# Patient Record
Sex: MALE | Race: WHITE
[De-identification: names, ages, dates, MRNs, and addresses within clinical notes are randomized per-mention and may not be internally consistent; named-entity substitution may affect disease eponyms.]

---

## 2022-10-31 ENCOUNTER — HOSPITAL ENCOUNTER (INPATIENT)
Dept: HOSPITAL 54 - ER | Age: 77
LOS: 2 days | Discharge: ANOTHER HOSPITAL | DRG: 193 | End: 2022-11-02
Attending: NURSE PRACTITIONER | Admitting: NURSE PRACTITIONER
Payer: COMMERCIAL

## 2022-10-31 VITALS — WEIGHT: 294 LBS | BODY MASS INDEX: 38.97 KG/M2 | HEIGHT: 73 IN

## 2022-10-31 DIAGNOSIS — J44.1: ICD-10-CM

## 2022-10-31 DIAGNOSIS — J96.01: ICD-10-CM

## 2022-10-31 DIAGNOSIS — E66.01: ICD-10-CM

## 2022-10-31 DIAGNOSIS — Z87.891: ICD-10-CM

## 2022-10-31 DIAGNOSIS — Z79.899: ICD-10-CM

## 2022-10-31 DIAGNOSIS — E78.5: ICD-10-CM

## 2022-10-31 DIAGNOSIS — J15.9: Primary | ICD-10-CM

## 2022-10-31 DIAGNOSIS — E03.9: ICD-10-CM

## 2022-10-31 DIAGNOSIS — Z20.822: ICD-10-CM

## 2022-10-31 DIAGNOSIS — E87.1: ICD-10-CM

## 2022-10-31 DIAGNOSIS — E66.9: ICD-10-CM

## 2022-10-31 DIAGNOSIS — G89.29: ICD-10-CM

## 2022-10-31 DIAGNOSIS — I10: ICD-10-CM
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
## 2022-10-31 DIAGNOSIS — Z79.51: ICD-10-CM

## 2022-10-31 DIAGNOSIS — G47.33: ICD-10-CM

## 2022-10-31 DIAGNOSIS — Z91.048: ICD-10-CM

## 2022-10-31 DIAGNOSIS — D50.9: ICD-10-CM

## 2022-10-31 DIAGNOSIS — E88.09: ICD-10-CM

## 2022-10-31 DIAGNOSIS — Z85.46: ICD-10-CM

## 2022-10-31 DIAGNOSIS — Z79.01: ICD-10-CM

## 2022-10-31 DIAGNOSIS — Z91.018: ICD-10-CM

## 2022-10-31 DIAGNOSIS — N40.0: ICD-10-CM

## 2022-10-31 DIAGNOSIS — I48.91: ICD-10-CM

## 2022-10-31 LAB
ALBUMIN SERPL BCP-MCNC: 2.4 G/DL (ref 3.4–5)
ALP SERPL-CCNC: 102 U/L (ref 46–116)
ALT SERPL W P-5'-P-CCNC: 11 U/L (ref 12–78)
AST SERPL W P-5'-P-CCNC: 20 U/L (ref 15–37)
BASE EXCESS BLDA CALC-SCNC: -2.6 MMOL/L
BASE EXCESS BLDA CALC-SCNC: -3.2 MMOL/L
BASOPHILS # BLD AUTO: 0 K/UL (ref 0–0.2)
BASOPHILS NFR BLD AUTO: 0.2 % (ref 0–2)
BILIRUB DIRECT SERPL-MCNC: 0.5 MG/DL (ref 0–0.2)
BILIRUB SERPL-MCNC: 0.7 MG/DL (ref 0.2–1)
BUN SERPL-MCNC: 11 MG/DL (ref 7–18)
CALCIUM SERPL-MCNC: 8.4 MG/DL (ref 8.5–10.1)
CHLORIDE SERPL-SCNC: 96 MMOL/L (ref 98–107)
CO2 SERPL-SCNC: 27 MMOL/L (ref 21–32)
CREAT SERPL-MCNC: 0.8 MG/DL (ref 0.6–1.3)
EOSINOPHIL NFR BLD AUTO: 5.1 % (ref 0–6)
GLUCOSE SERPL-MCNC: 104 MG/DL (ref 74–106)
HCT VFR BLD AUTO: 41 % (ref 39–51)
HGB BLD-MCNC: 13.1 G/DL (ref 13.5–17.5)
INHALED O2 CONCENTRATION: 28 %
INHALED O2 CONCENTRATION: 50 %
LYMPHOCYTES NFR BLD AUTO: 1.3 K/UL (ref 0.8–4.8)
LYMPHOCYTES NFR BLD AUTO: 12.2 % (ref 20–44)
MCHC RBC AUTO-ENTMCNC: 32 G/DL (ref 31–36)
MCV RBC AUTO: 96 FL (ref 80–96)
MONOCYTES NFR BLD AUTO: 1.3 K/UL (ref 0.1–1.3)
MONOCYTES NFR BLD AUTO: 11.9 % (ref 2–12)
NEUTROPHILS # BLD AUTO: 7.6 K/UL (ref 1.8–8.9)
NEUTROPHILS NFR BLD AUTO: 70.6 % (ref 43–81)
PCO2 TEMP ADJ BLDA: 38 MMHG (ref 35–45)
PCO2 TEMP ADJ BLDA: 45.1 MMHG (ref 35–45)
PH TEMP ADJ BLDA: 7.33 [PH] (ref 7.35–7.45)
PH TEMP ADJ BLDA: 7.37 [PH] (ref 7.35–7.45)
PLATELET # BLD AUTO: 282 K/UL (ref 150–450)
PO2 TEMP ADJ BLDA: 41.8 MMHG (ref 75–100)
PO2 TEMP ADJ BLDA: 71.4 MMHG (ref 75–100)
POTASSIUM SERPL-SCNC: 5.1 MMOL/L (ref 3.5–5.1)
PROT SERPL-MCNC: 6.4 G/DL (ref 6.4–8.2)
RBC # BLD AUTO: 4.27 MIL/UL (ref 4.5–6)
SODIUM SERPL-SCNC: 130 MMOL/L (ref 136–145)
WBC NRBC COR # BLD AUTO: 10.8 K/UL (ref 4.3–11)

## 2022-10-31 PROCEDURE — A4217 STERILE WATER/SALINE, 500 ML: HCPCS

## 2022-10-31 PROCEDURE — C9803 HOPD COVID-19 SPEC COLLECT: HCPCS

## 2022-10-31 PROCEDURE — A6253 ABSORPT DRG > 48 SQ IN W/O B: HCPCS

## 2022-10-31 PROCEDURE — G0378 HOSPITAL OBSERVATION PER HR: HCPCS

## 2022-10-31 PROCEDURE — A6403 STERILE GAUZE>16 <= 48 SQ IN: HCPCS

## 2022-10-31 NOTE — NUR
MARJ FROM [facility] CALLED NO BEDS AVAILABLE. AUTH TO STAY [id number] PER 

ALEKSANDRA MARTINEZ.

## 2022-10-31 NOTE — NUR
CALLED [facility] [phone] [facility] [[email or web address]] WILL CALL US BACK.

## 2022-10-31 NOTE — NUR
BIB RA C/O SOB AND DESATURATION AT [facility]F PTA; HX OF COPD. PLACED ON BED, AAOX4, 
DYSPNEIC RR- 25, SATURATING AT 96% WITH NRM. MD AND RESPIRATORY TECH AT 
BEDSIDE.

## 2022-10-31 NOTE — NUR
CALLED [facility] [phone] PER ENRIQUE THEY ARE STILL WORKING ON BED 
ASSIGNMENT. AND WILL CALL US BACK.

## 2022-11-01 VITALS — DIASTOLIC BLOOD PRESSURE: 77 MMHG | SYSTOLIC BLOOD PRESSURE: 121 MMHG

## 2022-11-01 RX ADMIN — FERROUS SULFATE TAB 325 MG (65 MG ELEMENTAL FE) SCH MG: 325 (65 FE) TAB at 17:37

## 2022-11-01 RX ADMIN — CALCIUM SCH MG: 500 TABLET ORAL at 17:37

## 2022-11-01 RX ADMIN — TAMSULOSIN HYDROCHLORIDE SCH MG: 0.4 CAPSULE ORAL at 21:23

## 2022-11-01 RX ADMIN — Medication SCH MG: at 17:37

## 2022-11-01 RX ADMIN — ALBUTEROL SULFATE SCH MG: 2.5 SOLUTION RESPIRATORY (INHALATION) at 14:56

## 2022-11-01 RX ADMIN — PANTOPRAZOLE SODIUM SCH MG: 40 TABLET, DELAYED RELEASE ORAL at 07:34

## 2022-11-01 RX ADMIN — Medication SCH MG: at 08:16

## 2022-11-01 RX ADMIN — Medication SCH MG: at 14:56

## 2022-11-01 RX ADMIN — POTASSIUM CHLORIDE SCH MEQ: 750 TABLET, FILM COATED, EXTENDED RELEASE ORAL at 10:03

## 2022-11-01 RX ADMIN — ALBUTEROL SULFATE SCH MG: 2.5 SOLUTION RESPIRATORY (INHALATION) at 11:30

## 2022-11-01 RX ADMIN — THERA TABS SCH UDTAB: TAB at 10:04

## 2022-11-01 RX ADMIN — ATORVASTATIN CALCIUM SCH MG: 10 TABLET, FILM COATED ORAL at 21:23

## 2022-11-01 RX ADMIN — ALBUTEROL SULFATE SCH MG: 2.5 SOLUTION RESPIRATORY (INHALATION) at 20:04

## 2022-11-01 RX ADMIN — Medication SCH MG: at 10:03

## 2022-11-01 RX ADMIN — GABAPENTIN SCH MG: 100 CAPSULE ORAL at 13:49

## 2022-11-01 RX ADMIN — DABIGATRAN ETEXILATE MESYLATE SCH MG: 150 CAPSULE ORAL at 17:39

## 2022-11-01 RX ADMIN — FUROSEMIDE SCH MG: 10 INJECTION, SOLUTION INTRAMUSCULAR; INTRAVENOUS at 10:02

## 2022-11-01 RX ADMIN — Medication SCH MG: at 13:30

## 2022-11-01 RX ADMIN — GABAPENTIN SCH MG: 100 CAPSULE ORAL at 17:38

## 2022-11-01 RX ADMIN — Medication SCH MG: at 11:30

## 2022-11-01 RX ADMIN — FERROUS SULFATE TAB 325 MG (65 MG ELEMENTAL FE) SCH MG: 325 (65 FE) TAB at 10:03

## 2022-11-01 RX ADMIN — Medication SCH MG: at 23:40

## 2022-11-01 RX ADMIN — LEVOFLOXACIN SCH MG: 250 TABLET, FILM COATED ORAL at 11:58

## 2022-11-01 RX ADMIN — LEVOTHYROXINE SODIUM SCH MCG: 50 TABLET ORAL at 07:34

## 2022-11-01 RX ADMIN — Medication SCH MG: at 20:04

## 2022-11-01 RX ADMIN — POLYETHYLENE GLYCOL 3350 SCH GM: 17 POWDER, FOR SOLUTION ORAL at 09:00

## 2022-11-01 RX ADMIN — DOCUSATE SODIUM SCH MG: 250 CAPSULE, LIQUID FILLED ORAL at 10:03

## 2022-11-01 RX ADMIN — GABAPENTIN SCH MG: 100 CAPSULE ORAL at 10:04

## 2022-11-01 RX ADMIN — Medication SCH GM: at 10:50

## 2022-11-01 RX ADMIN — ALBUTEROL SULFATE SCH MG: 2.5 SOLUTION RESPIRATORY (INHALATION) at 23:40

## 2022-11-01 RX ADMIN — AMLODIPINE BESYLATE SCH MG: 2.5 TABLET ORAL at 17:37

## 2022-11-01 RX ADMIN — ZINC OXIDE SCH GM: 200 OINTMENT TOPICAL at 15:43

## 2022-11-01 RX ADMIN — Medication SCH MG: at 19:30

## 2022-11-01 RX ADMIN — Medication SCH MG: at 20:06

## 2022-11-01 RX ADMIN — ENOXAPARIN SODIUM SCH MG: 40 INJECTION SUBCUTANEOUS at 12:01

## 2022-11-01 RX ADMIN — CALCIUM SCH MG: 500 TABLET ORAL at 10:04

## 2022-11-01 RX ADMIN — Medication SCH MG: at 10:04

## 2022-11-01 RX ADMIN — OXYCODONE HYDROCHLORIDE AND ACETAMINOPHEN SCH MG: 500 TABLET ORAL at 10:04

## 2022-11-01 RX ADMIN — DOCUSATE SODIUM SCH MG: 250 CAPSULE, LIQUID FILLED ORAL at 17:37

## 2022-11-01 RX ADMIN — AMLODIPINE BESYLATE SCH MG: 2.5 TABLET ORAL at 10:05

## 2022-11-01 RX ADMIN — DABIGATRAN ETEXILATE MESYLATE SCH MG: 150 CAPSULE ORAL at 09:00

## 2022-11-01 RX ADMIN — VITAMIN D, TAB 1000IU (100/BT) SCH UNIT: 25 TAB at 10:02
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
## 2022-11-01 NOTE — NUR
Accepting RN Note

Patient Arrived to unit safely. AOx4 able to express his own concerns, patient states he is 
in no pain at the moment. IV s/l with no signs of infiltration. Patient able to provide his 
own health history, states daughter will be coming in later on today. All safety precautions 
taken, patient on O2 1lt with no signs of distress. All safety precautions taken, call light 
and table within reach, bed in fowlers position, at lowest. Will continue to monitor 
throughout shift.

## 2022-11-01 NOTE — NUR
RN OPENING NOTES;



RECEIVED PT IN BED SLEEPING BUT EASY TO AROUSED,AOX3 ABLE TO MAKES NEED KNOWN.ON 1L O2 VIA 
NC ADI WELL SATTING 98%,NO SIGN SOB/DISTRESS NOTED,NO COMPLAIN OF PAIN/DISCOMFORT AT THIS 
TIME,IV ACCESS L CHEST PORT A CATH PATENT AND INTACT,SAFETY MEASURED INPLACE,CALL LIGHT 
WITHIN REACH,WILL CONTINUE TO MONITOR.

## 2022-11-01 NOTE — NUR
RN NOTES;



PATIENT WAS HAVING UNCONTROL AFIB, ,I LISANDRA COBB,LIZANDRO TALBERT,WITH A NEW ORDERED 
CARDIZEM 10MG/2ML,WAS GIVEN,NO  SIGN A/R NOTED.

## 2022-11-02 VITALS — SYSTOLIC BLOOD PRESSURE: 113 MMHG | DIASTOLIC BLOOD PRESSURE: 94 MMHG

## 2022-11-02 VITALS — SYSTOLIC BLOOD PRESSURE: 116 MMHG | DIASTOLIC BLOOD PRESSURE: 83 MMHG

## 2022-11-02 VITALS — SYSTOLIC BLOOD PRESSURE: 123 MMHG | DIASTOLIC BLOOD PRESSURE: 83 MMHG

## 2022-11-02 VITALS — SYSTOLIC BLOOD PRESSURE: 120 MMHG | DIASTOLIC BLOOD PRESSURE: 55 MMHG

## 2022-11-02 VITALS — SYSTOLIC BLOOD PRESSURE: 130 MMHG | DIASTOLIC BLOOD PRESSURE: 65 MMHG

## 2022-11-02 VITALS — SYSTOLIC BLOOD PRESSURE: 121 MMHG | DIASTOLIC BLOOD PRESSURE: 73 MMHG

## 2022-11-02 LAB
BASOPHILS # BLD AUTO: 0.1 K/UL (ref 0–0.2)
BASOPHILS NFR BLD AUTO: 0.6 % (ref 0–2)
BUN SERPL-MCNC: 20 MG/DL (ref 7–18)
CALCIUM SERPL-MCNC: 8.5 MG/DL (ref 8.5–10.1)
CHLORIDE SERPL-SCNC: 94 MMOL/L (ref 98–107)
CHOLEST SERPL-MCNC: 152 MG/DL (ref ?–200)
CO2 SERPL-SCNC: 28 MMOL/L (ref 21–32)
CREAT SERPL-MCNC: 0.9 MG/DL (ref 0.6–1.3)
EOSINOPHIL NFR BLD AUTO: 0 % (ref 0–6)
GLUCOSE SERPL-MCNC: 191 MG/DL (ref 74–106)
HCT VFR BLD AUTO: 36 % (ref 39–51)
HDLC SERPL-MCNC: 51 MG/DL (ref 40–60)
HGB BLD-MCNC: 11.8 G/DL (ref 13.5–17.5)
LDLC SERPL DIRECT ASSAY-MCNC: 81 MG/DL (ref 0–99)
LYMPHOCYTES NFR BLD AUTO: 0.4 K/UL (ref 0.8–4.8)
LYMPHOCYTES NFR BLD AUTO: 3.9 % (ref 20–44)
MAGNESIUM SERPL-MCNC: 2 MG/DL (ref 1.8–2.4)
MCHC RBC AUTO-ENTMCNC: 33 G/DL (ref 31–36)
MCV RBC AUTO: 94 FL (ref 80–96)
MONOCYTES NFR BLD AUTO: 0.9 K/UL (ref 0.1–1.3)
MONOCYTES NFR BLD AUTO: 8.9 % (ref 2–12)
NEUTROPHILS # BLD AUTO: 9.3 K/UL (ref 1.8–8.9)
NEUTROPHILS NFR BLD AUTO: 86.6 % (ref 43–81)
PHOSPHATE SERPL-MCNC: 2.8 MG/DL (ref 2.5–4.9)
PLATELET # BLD AUTO: 255 K/UL (ref 150–450)
POTASSIUM SERPL-SCNC: 4.1 MMOL/L (ref 3.5–5.1)
RBC # BLD AUTO: 3.81 MIL/UL (ref 4.5–6)
SODIUM SERPL-SCNC: 132 MMOL/L (ref 136–145)
TRIGL SERPL-MCNC: 190 MG/DL (ref 30–150)
TSH SERPL DL<=0.005 MIU/L-ACNC: 1.35 UIU/ML (ref 0.36–3.74)
TSH SERPL DL<=0.005 MIU/L-ACNC: 1.38 UIU/ML (ref 0.36–3.74)
WBC NRBC COR # BLD AUTO: 10.7 K/UL (ref 4.3–11)

## 2022-11-02 RX ADMIN — ALBUTEROL SULFATE SCH MG: 2.5 SOLUTION RESPIRATORY (INHALATION) at 08:08

## 2022-11-02 RX ADMIN — Medication SCH MG: at 09:36

## 2022-11-02 RX ADMIN — Medication SCH MG: at 14:55

## 2022-11-02 RX ADMIN — LEVOFLOXACIN SCH MG: 250 TABLET, FILM COATED ORAL at 11:47

## 2022-11-02 RX ADMIN — PANTOPRAZOLE SODIUM SCH MG: 40 TABLET, DELAYED RELEASE ORAL at 08:33

## 2022-11-02 RX ADMIN — FERROUS SULFATE TAB 325 MG (65 MG ELEMENTAL FE) SCH MG: 325 (65 FE) TAB at 09:36

## 2022-11-02 RX ADMIN — ZINC OXIDE SCH GM: 200 OINTMENT TOPICAL at 09:54

## 2022-11-02 RX ADMIN — ALBUTEROL SULFATE SCH MG: 2.5 SOLUTION RESPIRATORY (INHALATION) at 11:37

## 2022-11-02 RX ADMIN — DABIGATRAN ETEXILATE MESYLATE SCH MG: 150 CAPSULE ORAL at 09:47

## 2022-11-02 RX ADMIN — LEVOTHYROXINE SODIUM SCH MCG: 50 TABLET ORAL at 08:34

## 2022-11-02 RX ADMIN — FUROSEMIDE SCH MG: 10 INJECTION, SOLUTION INTRAMUSCULAR; INTRAVENOUS at 09:38

## 2022-11-02 RX ADMIN — DOCUSATE SODIUM SCH MG: 250 CAPSULE, LIQUID FILLED ORAL at 17:36

## 2022-11-02 RX ADMIN — OXYCODONE HYDROCHLORIDE AND ACETAMINOPHEN SCH MG: 500 TABLET ORAL at 09:36

## 2022-11-02 RX ADMIN — AMLODIPINE BESYLATE SCH MG: 2.5 TABLET ORAL at 17:37

## 2022-11-02 RX ADMIN — Medication SCH MG: at 17:36

## 2022-11-02 RX ADMIN — GABAPENTIN SCH MG: 100 CAPSULE ORAL at 14:19

## 2022-11-02 RX ADMIN — Medication SCH MG: at 11:37

## 2022-11-02 RX ADMIN — GABAPENTIN SCH MG: 100 CAPSULE ORAL at 17:37

## 2022-11-02 RX ADMIN — Medication SCH MG: at 08:06

## 2022-11-02 RX ADMIN — GABAPENTIN SCH MG: 100 CAPSULE ORAL at 09:33

## 2022-11-02 RX ADMIN — Medication SCH MG: at 09:35

## 2022-11-02 RX ADMIN — DOCUSATE SODIUM SCH MG: 250 CAPSULE, LIQUID FILLED ORAL at 09:35

## 2022-11-02 RX ADMIN — ENOXAPARIN SODIUM SCH MG: 40 INJECTION SUBCUTANEOUS at 09:37

## 2022-11-02 RX ADMIN — TAMSULOSIN HYDROCHLORIDE SCH MG: 0.4 CAPSULE ORAL at 21:18

## 2022-11-02 RX ADMIN — FERROUS SULFATE TAB 325 MG (65 MG ELEMENTAL FE) SCH MG: 325 (65 FE) TAB at 17:36

## 2022-11-02 RX ADMIN — DABIGATRAN ETEXILATE MESYLATE SCH MG: 150 CAPSULE ORAL at 17:36

## 2022-11-02 RX ADMIN — Medication SCH GM: at 09:54

## 2022-11-02 RX ADMIN — POLYETHYLENE GLYCOL 3350 SCH GM: 17 POWDER, FOR SOLUTION ORAL at 09:37

## 2022-11-02 RX ADMIN — Medication SCH MG: at 03:30

## 2022-11-02 RX ADMIN — THERA TABS SCH UDTAB: TAB at 09:34

## 2022-11-02 RX ADMIN — VITAMIN D, TAB 1000IU (100/BT) SCH UNIT: 25 TAB at 09:34

## 2022-11-02 RX ADMIN — Medication SCH MG: at 19:47

## 2022-11-02 RX ADMIN — CALCIUM SCH MG: 500 TABLET ORAL at 17:36

## 2022-11-02 RX ADMIN — CALCIUM SCH MG: 500 TABLET ORAL at 09:35

## 2022-11-02 RX ADMIN — POTASSIUM CHLORIDE SCH MEQ: 750 TABLET, FILM COATED, EXTENDED RELEASE ORAL at 09:34

## 2022-11-02 RX ADMIN — ALBUTEROL SULFATE SCH MG: 2.5 SOLUTION RESPIRATORY (INHALATION) at 03:30

## 2022-11-02 RX ADMIN — AMLODIPINE BESYLATE SCH MG: 2.5 TABLET ORAL at 09:35

## 2022-11-02 RX ADMIN — ATORVASTATIN CALCIUM SCH MG: 10 TABLET, FILM COATED ORAL at 21:18

## 2022-11-02 NOTE — NUR
WOUND CARE CONSULT: PT UNSTABLE AT THIS TIME TO BE TURNED FOR SKIN ASSESSMENT PER CHARGE RN. 
DISCUSSED SKIN PROTECTION WITH NURSING STAFF. [facility] AIR BED ORDERED. MD IN AGREEMENT 
WITH PLAN OF CARE. WILL SEE PT AS PT CONDITION PERMITS.

## 2022-11-02 NOTE — NUR
RN Closing Note

Pt. AOx4 . Patient with no signs of discomfort or distress. Provided care as needed and 
medications as prescribed 

All safety precautions taken throughout shift. IV with no signs of infiltration, no pain at 
site reported. Performed skin assessment. Patient remained safe, call light and table within 
reach, bed at lowest position. Will endorse report to night nurse for continuity of care.

## 2022-11-02 NOTE — NUR
RN NOTES:

CALLED PHARMACY TO DELIVER AMIODARONE AWAITING TO CLARIFY ORDER, CHARGE NURSE NICHOLE MIRANDA

## 2022-11-02 NOTE — NUR
RN NOTES;



PT STILL HAVING UNCONTROL AFIB 140  HR,TEXTED LIZANDRO COBB C,WITH A NEW ORDER 
AMIODARONE ggt PER PROTOCOL,BUT WE CANNOT GIVE AMIODARONE IN THE UNIT,NOTIFIED CHARGED NURSE 
ROM, SAID GET ORDER TO LIZANDRO COBB,TRANSFER PT TO [facility].

## 2022-11-02 NOTE — NUR
[facility]  RN OPENING NOTES:



RECEIVED PATIENT IN BED AWAKE, ALERT AND ORIENTED X 4, ON O2 AT 2L/MIN VIA NASAL CANULA, 
RESPIRATION IS EVEN AND UNLABORED, NO PAIN OR DISCOMFORT AT THIS TIME. ON AMIODARONE DRIP 
0.5MG/MIN. WITH  AFIB C PVC'S. IV ACCESS ON LEFT UPPER CHEST MAIKOL CATH, PT NOTED WITH 
LEFT ARM WITH SPLINT,NOTED WITH LEFT BKA. SAFTEY ,MEASURES IN PLACED; BED IN LOWEST AND 
LOCKED POSITION, SIDE RAILS UP, CALL LIGHT WITHIN REACH. FAMILY MEMBER AT BEDSIDE. WILL 
CONTINUE TO MONITOR THROUGHOUT THE SHIFT.

## 2022-11-02 NOTE — NUR
RN NOTE



PT IN BED, SLEEPING. NO S/SX OF ACUTE DISTRESS AT THIS TIME. NO SOB, NO PAIN. PT STILL AFIB 
WITH BBB. O2 SAT IS 94%.



AMIO DRIP CURRENTLY RUNNING.



WILL ENDORSE TO AM SHIFT NURSE FOR FANTA.

## 2022-11-02 NOTE — NUR
TD RN OPENING NOTES:

RECEIVED PATIENT IN BED AWAKE, ALERT AND ORIENTED X 4, ON O 2 2L/MIN VIA NASAL CANULA, 
RESPIRATION IS EVEN AND UNLABORED,NO PAIN OR DISCOMFORT. ON AMIODARONE DRIP 1MG/MIN. WITH HR 
97.IV ACCESS LEFT UPPER CHEST MAIKOL CATH, PT NOTED WITH LEFT ARM WITH SPLINT, PER REPORT NO 
FRACTURE BUT FOR SUPPORT ONLY.WITH LEFT BKA.BED IN LOW AND LOCKED POSTION, SIDE RAILS UP, 
CALL LIGHT WITHIN REACH.WILL MONITOR

## 2022-11-02 NOTE — NUR
RN NOTE



PATIENT PICKED UP BY AMBULANCE TRANSFER TO [facility]. BELONGINGS TAKEN WITH PATIENT, 
HANGED NEW BAG OF AMIODARONE DRIP, UNABLE TO SCAN BAG DUE TO D/C STATUS ON eMAR. CANDY CHEN 
MADE AWARE.

## 2022-11-02 NOTE — NUR
rn notes:

 unable t scan amiodarone drip pharmacy aware dose changed to 0.5 mg/minbp 121/73, hr 83. DR Zurita aware to continue amiodarone drip
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
## 2024-03-06 NOTE — NUR
[facility] CALLED [facility] [phone] PT ACCEPTED IN [facility] ,NUMBER FOR REPORT 
[phone],ACCEPTING MD DR. ALISSA RIZVI  PICKUP AT 2300,FAMILY AT BEDSIDE NOTIFIED.ENDORSED TO 
APRIL CHARGE NURSE FOR NIGHT SHIFT. 6